# Patient Record
Sex: MALE | Race: WHITE | NOT HISPANIC OR LATINO | Employment: FULL TIME | URBAN - METROPOLITAN AREA
[De-identification: names, ages, dates, MRNs, and addresses within clinical notes are randomized per-mention and may not be internally consistent; named-entity substitution may affect disease eponyms.]

---

## 2021-11-09 ENCOUNTER — TELEPHONE (OUTPATIENT)
Dept: GASTROENTEROLOGY | Facility: CLINIC | Age: 61
End: 2021-11-09

## 2021-11-09 NOTE — TELEPHONE ENCOUNTER
Per KH, patient needs to schedule an office visit to see him for colon polyps. Left message at [phone] for patient to call back to schedule this.

## 2021-11-10 ENCOUNTER — TELEPHONE (OUTPATIENT)
Dept: GASTROENTEROLOGY | Facility: CLINIC | Age: 61
End: 2021-11-10

## 2021-11-18 ENCOUNTER — TELEPHONE (OUTPATIENT)
Dept: GASTROENTEROLOGY | Facility: CLINIC | Age: 61
End: 2021-11-18

## 2021-11-18 NOTE — TELEPHONE ENCOUNTER
Patient called about his colonoscopy procedure. Told the patient Per KH he need to come in for an office visit prior to the procedure. Patient don't want a office visit and only want the colonoscopy. Told the patient I will route this to Dr. Monique and his team.

## 2021-11-21 NOTE — TELEPHONE ENCOUNTER
AL - My concern is that he is on warfarin and I haven't seen him for a long time. If he says that he doesn't want to come see me prior to the colonoscopy then you will need to check with his doctor to make sure that it is OK to stop the warfarin for 4 days prior to the colonoscopy. On the day of the colonoscopy have [facility] draw a stat PT/INR. Also make sure that he knows all of the potential complications (that we would normal discuss in the office): reaction to medicines, bleeding or bruising, infection, stroke or MI, bowel perforation, and death.  Thx. kjh

## 2021-11-24 NOTE — TELEPHONE ENCOUNTER
AL - these are issues that I should discuss with this gentleman in the office prior to the colonoscopy. If he doesn't want to come see me to discuss the proposed procedure and the potential complications, then I would have him find another doctor to do the colonoscopy. Thx.kjh

## 2021-11-29 ENCOUNTER — TELEPHONE (OUTPATIENT)
Dept: GASTROENTEROLOGY | Facility: CLINIC | Age: 61
End: 2021-11-29

## 2021-11-29 NOTE — TELEPHONE ENCOUNTER
Returned call to Nellie at Dr Barnes's office and she was asking about the pt getting scheduled.  Advised that she can see the message trail from 11/18. Advised since pt has not been seen in years and Dr Monique would like to see the pt in the office .  Advised pt is not wanting to make office appt. Dr Monique advised if  pt does not want to be seen , he find a different MD to do his c/s.  She verb understanding and states she will show message to Dr Barnes.     Update sent to Dr Monique.

## 2021-11-29 NOTE — TELEPHONE ENCOUNTER
----- Message from Margoth Gar Rep sent at 11/29/2021  9:28 AM EST -----  Regarding: questions  Nellie from Dr Barnes office, [phone] option 5, needs to speak to you in regards to pt and notes per you and Dr Monique.

## 2022-02-10 ENCOUNTER — TELEPHONE (OUTPATIENT)
Dept: GASTROENTEROLOGY | Facility: CLINIC | Age: 62
End: 2022-02-10

## 2022-02-10 ENCOUNTER — OFFICE VISIT (OUTPATIENT)
Dept: GASTROENTEROLOGY | Facility: CLINIC | Age: 62
End: 2022-02-10

## 2022-02-10 VITALS
HEART RATE: 51 BPM | DIASTOLIC BLOOD PRESSURE: 88 MMHG | HEIGHT: 70 IN | WEIGHT: 208 LBS | OXYGEN SATURATION: 96 % | SYSTOLIC BLOOD PRESSURE: 151 MMHG | TEMPERATURE: 96 F | BODY MASS INDEX: 29.78 KG/M2

## 2022-02-10 DIAGNOSIS — R10.30 LOWER ABDOMINAL PAIN: Primary | ICD-10-CM

## 2022-02-10 DIAGNOSIS — Z12.11 ENCOUNTER FOR SCREENING FOR MALIGNANT NEOPLASM OF COLON: ICD-10-CM

## 2022-02-10 PROCEDURE — 99204 OFFICE O/P NEW MOD 45 MIN: CPT | Performed by: INTERNAL MEDICINE

## 2022-02-10 RX ORDER — EZETIMIBE 10 MG/1
10 TABLET ORAL DAILY
COMMUNITY
Start: 2022-01-06

## 2022-02-10 RX ORDER — CHLORAL HYDRATE 500 MG
2 CAPSULE ORAL
COMMUNITY

## 2022-02-10 RX ORDER — WARFARIN SODIUM 5 MG/1
1.5 TABLET ORAL DAILY
COMMUNITY
Start: 2021-10-25

## 2022-02-10 RX ORDER — DIPHENOXYLATE HYDROCHLORIDE AND ATROPINE SULFATE 2.5; .025 MG/1; MG/1
1 TABLET ORAL DAILY
COMMUNITY

## 2022-02-10 RX ORDER — METOPROLOL SUCCINATE 100 MG/1
100 TABLET, EXTENDED RELEASE ORAL
COMMUNITY
Start: 2022-01-04

## 2022-02-10 RX ORDER — MONTELUKAST SODIUM 4 MG/1
TABLET, CHEWABLE ORAL
COMMUNITY
Start: 2021-10-15

## 2022-02-10 RX ORDER — ICOSAPENT ETHYL 1000 MG/1
2 CAPSULE ORAL
COMMUNITY
Start: 2021-12-16 | End: 2022-12-16

## 2022-02-10 RX ORDER — FENOFIBRATE 160 MG/1
1 TABLET ORAL DAILY
COMMUNITY
Start: 2021-12-16

## 2022-02-10 NOTE — TELEPHONE ENCOUNTER
XIMENA William  for colonoscopy on  05/17/2022 arrive at 730am   . Mailed Prep instructions to Mailing address on-file. ----[employer]      Advised PT  that  will call with final arrival time  24 hrs before procedure. If they do not get a phone call, arrival time will stay the same as given on instructions

## 2022-02-10 NOTE — PROGRESS NOTES
Chief Complaint   Patient presents with   • screening for colonoscopy       History of Present Illness:   61 y.o. male who is on warfarin because of DVT and is here to schedule a screening colonoscopy. No rectal bleeding or melena. No diarrhea.  NO consitpation. He has lower abdominal discomfort worse after taking meds in the AM. It is better after BM. No nausea or vomiting. No heartburrn. Used to travel to [place] for work. 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 Weight has increased. PCP is Dr. Hany Barnes. NO heartburn or dysphagia.     Past Medical History:   Diagnosis Date   • History of DVT (deep vein thrombosis)    • Hyperlipidemia        Past Surgical History:   Procedure Laterality Date   • COLONOSCOPY  2002         Current Outpatient Medications:   •  colestipol (COLESTID) 1 g tablet, Take 2 tablets, twice daily for a total of 8 g/day., Disp: , Rfl:   •  ezetimibe (ZETIA) 10 MG tablet, Take 10 mg by mouth Daily., Disp: , Rfl:   •  fenofibrate 160 MG tablet, Take 1 tablet by mouth Daily., Disp: , Rfl:   •  Glucos-Chondroit-Hyaluron-MSM (GLUCOSAMINE CHONDROITIN JOINT PO), Take  by mouth., Disp: , Rfl:   •  icosapent ethyl (VASCEPA) 1 g capsule capsule, Take 2 g by mouth., Disp: , Rfl:   •  metoprolol succinate XL (TOPROL-XL) 100 MG 24 hr tablet, Take 100 mg by mouth., Disp: , Rfl:   •  Milk Thistle 1000 MG capsule, Take  by mouth., Disp: , Rfl:   •  Multiple Vitamins-Minerals (OCUVITE ADULT 50+ PO), Take  by mouth., Disp: , Rfl:   •  multivitamin (multivitamin) tablet tablet, Take 1 tablet by mouth Daily., Disp: , Rfl:   •  Omega-3 Fatty Acids (fish oil) 1000 MG capsule capsule, Take 2 g by mouth., Disp: , Rfl:   •  warfarin (COUMADIN) 5 MG tablet, Take 1.5 tablets by mouth Daily., Disp: , Rfl:     No Known Allergies    Family History   Problem Relation Age of Onset   • Colon cancer Neg Hx    • Colon polyps Neg Hx        Social History     Socioeconomic History   • Marital status:    Tobacco  Use   • Smoking status: Never Smoker   • Smokeless tobacco: Never Used   Substance and Sexual Activity   • Alcohol use: Yes     Comment: 1-2 beers daily   • Drug use: Never       Review of Systems   Gastrointestinal: Negative for abdominal pain, constipation and diarrhea.   All other systems reviewed and are negative.    Pertinent positives and negatives documented in the HPI and all other systems reviewed and were found to be negative.  Vitals:    02/10/22 0955   BP: 151/88   Pulse: 51   Temp: 96 °F (35.6 °C)   SpO2: 96%       Physical Exam  Vitals reviewed.   Constitutional:       General: He is not in acute distress.     Appearance: Normal appearance. He is well-developed. He is not diaphoretic.   HENT:      Head: Normocephalic and atraumatic. Hair is normal.      Right Ear: Hearing, tympanic membrane, ear canal and external ear normal.      Left Ear: Hearing, tympanic membrane, ear canal and external ear normal.      Nose: Nose normal. No nasal deformity.      Mouth/Throat:      Mouth: Mucous membranes are moist. No oral lesions.      Pharynx: Uvula midline. No uvula swelling.   Eyes:      General: Lids are normal. No scleral icterus.        Right eye: No discharge.         Left eye: No discharge.      Extraocular Movements: Extraocular movements intact.      Right eye: Normal extraocular motion and no nystagmus.      Left eye: Normal extraocular motion and no nystagmus.      Conjunctiva/sclera: Conjunctivae normal.      Pupils: Pupils are equal, round, and reactive to light.   Neck:      Thyroid: No thyromegaly.      Vascular: No JVD.   Cardiovascular:      Rate and Rhythm: Normal rate and regular rhythm.      Pulses: Normal pulses.      Heart sounds: Normal heart sounds. No murmur heard.  No gallop.    Pulmonary:      Effort: Pulmonary effort is normal. No respiratory distress.      Breath sounds: Normal breath sounds. No wheezing or rales.   Chest:      Chest wall: No tenderness.   Abdominal:      General:  Bowel sounds are normal. There is no distension.      Palpations: Abdomen is soft. There is no mass.      Tenderness: There is no abdominal tenderness. There is no guarding.      Hernia: No hernia is present.   Musculoskeletal:         General: No tenderness or deformity. Normal range of motion.      Cervical back: Normal range of motion and neck supple.   Lymphadenopathy:      Cervical: No cervical adenopathy.   Skin:     General: Skin is warm and dry.      Findings: No rash.   Neurological:      Mental Status: He is alert and oriented to person, place, and time.      Cranial Nerves: No cranial nerve deficit.      Motor: No abnormal muscle tone.      Coordination: Coordination normal.      Deep Tendon Reflexes: Reflexes are normal and symmetric. Reflexes normal.   Psychiatric:         Mood and Affect: Mood normal.         Behavior: Behavior normal.         Thought Content: Thought content normal.         Judgment: Judgment normal.         Diagnoses and all orders for this visit:    1. Lower abdominal pain (Primary)  -     CT Abdomen Pelvis With Contrast; Future  -     Case Request; Standing  -     COVID PRE-OP / PRE-PROCEDURE SCREENING ORDER (NO ISOLATION) - Swab, Nasopharynx; Future  -     Case Request    2. Encounter for screening for malignant neoplasm of colon  -     CT Abdomen Pelvis With Contrast; Future  -     Case Request; Standing  -     COVID PRE-OP / PRE-PROCEDURE SCREENING ORDER (NO ISOLATION) - Swab, Nasopharynx; Future  -     Case Request    Other orders  -     Follow Anesthesia Guidelines / Standing Orders; Future  -     Obtain Informed Consent; Future      Assessment:  1. Screening colonoscopy  2. He is on Warfarin for DVT.  3. Lower abdominal discomfort    Recommendations:  1. Colonoscopy. He needs to stop warfarin for 4 days prior to the c/s.   2. CT abd/pelivs    Return He needs to be off of warfarin for 4 days prior to the colonoscopy. Get a stat PT/INR on day of col.    Zane Monique  MD  2/10/2022

## 2022-02-22 ENCOUNTER — TELEPHONE (OUTPATIENT)
Dept: GASTROENTEROLOGY | Facility: CLINIC | Age: 62
End: 2022-02-22

## 2022-02-22 NOTE — TELEPHONE ENCOUNTER
Overdue alert received for CT abd/pelvis.     Call to pt.  Advise may contact [facility] to arrange.  Verb understanding.

## 2022-03-24 ENCOUNTER — HOSPITAL ENCOUNTER (OUTPATIENT)
Dept: CT IMAGING | Facility: HOSPITAL | Age: 62
Discharge: HOME | End: 2022-03-24
Admitting: INTERNAL MEDICINE

## 2022-03-24 DIAGNOSIS — R10.30 LOWER ABDOMINAL PAIN: ICD-10-CM

## 2022-03-24 DIAGNOSIS — Z12.11 ENCOUNTER FOR SCREENING FOR MALIGNANT NEOPLASM OF COLON: ICD-10-CM

## 2022-03-24 LAB — CREAT BLDA-MCNC: 1.1 MG/DL (ref 0.6–1.3)

## 2022-03-24 PROCEDURE — 74177 CT ABD & PELVIS W/CONTRAST: CPT

## 2022-03-24 PROCEDURE — 25010000002 IOPAMIDOL 61 % SOLUTION: Performed by: INTERNAL MEDICINE

## 2022-03-24 PROCEDURE — 82565 ASSAY OF CREATININE: CPT

## 2022-03-24 RX ADMIN — IOPAMIDOL 85 ML: 612 INJECTION, SOLUTION INTRAVENOUS at 14:40

## 2022-04-11 ENCOUNTER — TELEPHONE (OUTPATIENT)
Dept: GASTROENTEROLOGY | Facility: CLINIC | Age: 62
End: 2022-04-11

## 2022-04-11 NOTE — TELEPHONE ENCOUNTER
"----- Message from Zane Monique MD sent at 4/9/2022  5:08 PM EDT -----  04/09/22       I reviewed the results of this CT abd/pelvis. I recommend that he go see a Urologist because of the prostatomegaly and the \"prostatomegaly protruding into the base of the bladder\". FAX to his PCP.   Mary giraldo  "

## 2022-05-17 ENCOUNTER — ANESTHESIA EVENT (OUTPATIENT)
Dept: GASTROENTEROLOGY | Facility: HOSPITAL | Age: 62
End: 2022-05-17

## 2022-05-17 ENCOUNTER — HOSPITAL ENCOUNTER (OUTPATIENT)
Facility: HOSPITAL | Age: 62
Setting detail: HOSPITAL OUTPATIENT SURGERY
Discharge: HOME | End: 2022-05-17
Attending: INTERNAL MEDICINE | Admitting: INTERNAL MEDICINE

## 2022-05-17 ENCOUNTER — ANESTHESIA (OUTPATIENT)
Dept: GASTROENTEROLOGY | Facility: HOSPITAL | Age: 62
End: 2022-05-17

## 2022-05-17 VITALS
WEIGHT: 208 LBS | BODY MASS INDEX: 29.78 KG/M2 | OXYGEN SATURATION: 98 % | HEIGHT: 70 IN | RESPIRATION RATE: 18 BRPM | DIASTOLIC BLOOD PRESSURE: 69 MMHG | HEART RATE: 70 BPM | SYSTOLIC BLOOD PRESSURE: 110 MMHG

## 2022-05-17 DIAGNOSIS — R10.30 LOWER ABDOMINAL PAIN: ICD-10-CM

## 2022-05-17 DIAGNOSIS — Z12.11 ENCOUNTER FOR SCREENING FOR MALIGNANT NEOPLASM OF COLON: ICD-10-CM

## 2022-05-17 LAB
INR PPP: 1.45 (ref 0.9–1.1)
PROTHROMBIN TIME: 17.6 SECONDS (ref 11.7–14.2)
PSA SERPL-MCNC: 2.09 NG/ML (ref 0–4)

## 2022-05-17 PROCEDURE — 85610 PROTHROMBIN TIME: CPT | Performed by: INTERNAL MEDICINE

## 2022-05-17 PROCEDURE — 45380 COLONOSCOPY AND BIOPSY: CPT | Performed by: INTERNAL MEDICINE

## 2022-05-17 PROCEDURE — 88305 TISSUE EXAM BY PATHOLOGIST: CPT | Performed by: INTERNAL MEDICINE

## 2022-05-17 PROCEDURE — 84153 ASSAY OF PSA TOTAL: CPT | Performed by: INTERNAL MEDICINE

## 2022-05-17 PROCEDURE — 25010000002 PROPOFOL 10 MG/ML EMULSION: Performed by: ANESTHESIOLOGY

## 2022-05-17 RX ORDER — SODIUM CHLORIDE 0.9 % (FLUSH) 0.9 %
10 SYRINGE (ML) INJECTION AS NEEDED
Status: DISCONTINUED | OUTPATIENT
Start: 2022-05-17 | End: 2022-05-17 | Stop reason: HOSPADM

## 2022-05-17 RX ORDER — LIDOCAINE HYDROCHLORIDE 20 MG/ML
INJECTION, SOLUTION INFILTRATION; PERINEURAL AS NEEDED
Status: DISCONTINUED | OUTPATIENT
Start: 2022-05-17 | End: 2022-05-17 | Stop reason: SURG

## 2022-05-17 RX ORDER — PROPOFOL 10 MG/ML
VIAL (ML) INTRAVENOUS CONTINUOUS PRN
Status: DISCONTINUED | OUTPATIENT
Start: 2022-05-17 | End: 2022-05-17 | Stop reason: SURG

## 2022-05-17 RX ORDER — SODIUM CHLORIDE, SODIUM LACTATE, POTASSIUM CHLORIDE, CALCIUM CHLORIDE 600; 310; 30; 20 MG/100ML; MG/100ML; MG/100ML; MG/100ML
1000 INJECTION, SOLUTION INTRAVENOUS CONTINUOUS
Status: DISCONTINUED | OUTPATIENT
Start: 2022-05-17 | End: 2022-05-17 | Stop reason: HOSPADM

## 2022-05-17 RX ADMIN — SODIUM CHLORIDE, POTASSIUM CHLORIDE, SODIUM LACTATE AND CALCIUM CHLORIDE 1000 ML: 600; 310; 30; 20 INJECTION, SOLUTION INTRAVENOUS at 08:53

## 2022-05-17 RX ADMIN — LIDOCAINE HYDROCHLORIDE 60 MG: 20 INJECTION, SOLUTION INFILTRATION; PERINEURAL at 10:41

## 2022-05-17 RX ADMIN — PROPOFOL 200 MCG/KG/MIN: 10 INJECTION, EMULSION INTRAVENOUS at 10:41

## 2022-05-17 NOTE — ANESTHESIA PREPROCEDURE EVALUATION
Anesthesia Evaluation     Patient summary reviewed and Nursing notes reviewed                Airway   Mallampati: I  TM distance: >3 FB  Neck ROM: full  No difficulty expected  Dental - normal exam     Pulmonary - negative pulmonary ROS and normal exam   Cardiovascular - normal exam    (+) hyperlipidemia,       Neuro/Psych- negative ROS  GI/Hepatic/Renal/Endo - negative ROS     Musculoskeletal (-) negative ROS    Abdominal  - normal exam    Bowel sounds: normal.   Substance History - negative use     OB/GYN negative ob/gyn ROS         Other                        Anesthesia Plan    ASA 3     MAC       Anesthetic plan, all risks, benefits, and alternatives have been provided, discussed and informed consent has been obtained with: patient.        CODE STATUS:

## 2022-05-17 NOTE — ANESTHESIA POSTPROCEDURE EVALUATION
"Patient: Veto Encarnacion    Procedure Summary     Date: 05/17/22 Room / Location: [facility] ENDOSCOPY 5 / [facility] ENDOSCOPY    Anesthesia Start: 1038 Anesthesia Stop: 1108    Procedure: COLONOSCOPY with polypectomy (cold bx) (N/A ) Diagnosis:       Lower abdominal pain      Encounter for screening for malignant neoplasm of colon      (Lower abdominal pain [R10.30])      (Encounter for screening for malignant neoplasm of colon [Z12.11])    Surgeons: Zane Monique MD Provider: John Nevarez MD    Anesthesia Type: MAC ASA Status: 3          Anesthesia Type: MAC    Vitals  Vitals Value Taken Time   /69 05/17/22 1131   Temp     Pulse 70 05/17/22 1131   Resp 18 05/17/22 1131   SpO2 98 % 05/17/22 1131           Post Anesthesia Care and Evaluation    Patient location during evaluation: PACU  Patient participation: complete - patient participated  Level of consciousness: awake  Pain score: 0  Pain management: adequate  Airway patency: patent  Anesthetic complications: No anesthetic complications  PONV Status: none  Cardiovascular status: acceptable  Respiratory status: acceptable  Hydration status: acceptable    Comments: /69 (BP Location: Left arm, Patient Position: Sitting)   Pulse 70   Resp 18   Ht 177.8 cm (70\")   Wt 94.3 kg (208 lb)   SpO2 98%   BMI 29.84 kg/m²       "

## 2022-05-18 LAB
LAB AP CASE REPORT: NORMAL
PATH REPORT.FINAL DX SPEC: NORMAL
PATH REPORT.GROSS SPEC: NORMAL

## 2022-05-18 NOTE — H&P
"[facility]  Pre Procedure History & Physical    Chief Complaint:   Time for my colonoscopy    Subjective     HPI:   62 y.o. male who is here for a screening colonoscopy.    Past Medical History:   Past Medical History:   Diagnosis Date   • History of DVT (deep vein thrombosis)    • Hyperlipidemia        Family History:  Family History   Problem Relation Age of Onset   • Colon cancer Neg Hx    • Colon polyps Neg Hx        Social History:   reports that he has never smoked. He has never used smokeless tobacco. He reports current alcohol use. He reports that he does not use drugs.    Medications:   No medications prior to admission.       Allergies:  Patient has no known allergies.    ROS:    Pertinent items are noted in HPI     Objective     Blood pressure 110/69, pulse 70, resp. rate 18, height 177.8 cm (70\"), weight 94.3 kg (208 lb), SpO2 98 %.    Physical Exam   Constitutional: Pt is oriented to person, place, and time and well-developed, well-nourished, and in no distress.   HENT:   Mouth/Throat: Oropharynx is clear and moist.   Neck: Normal range of motion. Neck supple.   Cardiovascular: Normal rate, regular rhythm and normal heart sounds.    Pulmonary/Chest: Effort normal and breath sounds normal. No respiratory distress. No  wheezes.   Abdominal: Soft. Bowel sounds are normal.   Skin: Skin is warm and dry.   Psychiatric: Mood, memory, affect and judgment normal.     Assessment & Plan     Diagnosis:  62 y.o. male who is here for a screening colonoscopy.    Anticipated Surgical Procedure:  Colonoscopy    The risks, benefits, and alternatives of this procedure have been discussed with the patient or the responsible party- the patient understands and agrees to proceed.    Zane Monique M.D.  "

## 2022-05-25 ENCOUNTER — TELEPHONE (OUTPATIENT)
Dept: GASTROENTEROLOGY | Facility: CLINIC | Age: 62
End: 2022-05-25

## 2022-05-25 NOTE — TELEPHONE ENCOUNTER
Called pt and advised of Dr Monique's note. Verb understanding.     C/s placed in recall and hm for 05/17/2025.    Results sent to Dr Barnes thru [facility].

## 2022-05-25 NOTE — PROGRESS NOTES
05/25/22       Tell him that his prostate-specific antigen blood test came back normal at 2.09.       his colon polyps that were removed were not cancerous but some were precancerous.  I would recommend that he have a repeat colonoscopy in 3 years.       send a copy of this report to his PCP.  Mary norwood

## 2022-05-25 NOTE — TELEPHONE ENCOUNTER
----- Message from Zane Monique MD sent at 5/25/2022  6:50 AM EDT -----  05/25/22       Tell him that his prostate-specific antigen blood test came back normal at 2.09.       his colon polyps that were removed were not cancerous but some were precancerous.  I would recommend that he have a repeat colonoscopy in 3 years.       send a copy of this report to his PCP.  Paddyx. kjh

## 2025-03-17 ENCOUNTER — TELEPHONE (OUTPATIENT)
Dept: GASTROENTEROLOGY | Facility: CLINIC | Age: 65
End: 2025-03-17
Payer: COMMERCIAL

## 2025-03-17 NOTE — TELEPHONE ENCOUNTER
Last colonoscopy 5/17/22    Personal hx polyps  No family hx polyps or cx    ASA or blood thinners:  Aspirin    List medications;  Metoprolol  Ezetimibe  Trazodone  Fish oil  Blink Nutri Tears  Total beets  Glucosamine  Centrum  Milk thistle    OA form scanned in media

## 2025-03-18 ENCOUNTER — PREP FOR SURGERY (OUTPATIENT)
Dept: OTHER | Facility: HOSPITAL | Age: 65
End: 2025-03-18
Payer: COMMERCIAL

## 2025-03-18 DIAGNOSIS — Z12.11 ENCOUNTER FOR SCREENING FOR MALIGNANT NEOPLASM OF COLON: Primary | ICD-10-CM

## 2025-03-19 ENCOUNTER — TELEPHONE (OUTPATIENT)
Dept: GASTROENTEROLOGY | Facility: CLINIC | Age: 65
End: 2025-03-19
Payer: COMMERCIAL

## 2025-03-19 NOTE — TELEPHONE ENCOUNTER
XIMENA JOHN  IN SCHEDULING PT SCHEDULED 06/05/2025 ARRIVING AT 1030AM FOR 12PM PROCEDURE PT VERBALIZES HE IS NOT ON COUMADIN WHICH IS LISTED IN HIS CHART CLS PREP INFORMATION MAILED TO ADDRESS ON FILE VERIFIED BY PT OK FOR HUB TO RELAY

## 2025-04-14 ENCOUNTER — TELEPHONE (OUTPATIENT)
Dept: GASTROENTEROLOGY | Facility: CLINIC | Age: 65
End: 2025-04-14

## 2025-04-14 NOTE — TELEPHONE ENCOUNTER
"    Hub staff attempted to follow warm transfer process and was unsuccessful     Caller: Veto Encarnacion \"Matt\"    Relationship to patient: Self    Best call back number: [phone]      Patient is needing: PT NEEDS TO RESCHEDULE PROCEDURE 6-5-25. PLS CALL PT TO DISCUSS.         "

## 2025-04-28 ENCOUNTER — TELEPHONE (OUTPATIENT)
Dept: GASTROENTEROLOGY | Facility: CLINIC | Age: 65
End: 2025-04-28
Payer: COMMERCIAL

## 2025-04-28 ENCOUNTER — TELEPHONE (OUTPATIENT)
Dept: GASTROENTEROLOGY | Facility: CLINIC | Age: 65
End: 2025-04-28

## 2025-04-28 NOTE — TELEPHONE ENCOUNTER
"  Caller: Veto Encarnacion \"Matt\"    Relationship: Self    Best call back number: [phone]       What was the call regarding: PATIENT NEEDS TO RESCHEDULE COLONOSCOPY. PLEASE CONTACT AND ADVISE.      "

## 2025-04-29 ENCOUNTER — TELEPHONE (OUTPATIENT)
Dept: GASTROENTEROLOGY | Facility: CLINIC | Age: 65
End: 2025-04-29
Payer: COMMERCIAL

## 2025-04-29 NOTE — TELEPHONE ENCOUNTER
"Hub staff attempted to follow warm transfer process and was unsuccessful     Caller: Veto Encarnacion \"Matt\"    Relationship to patient: Self    Best call back number: [phone]    Patient is needing: PATIENT NEEDS TO R/S PROCEDURE WITH DR BO ON 5-21-25        "

## 2025-04-30 NOTE — TELEPHONE ENCOUNTER
"Hub staff attempted to follow warm transfer process and was unsuccessful     Caller: Veto Encarnacion \"Matt\"    Relationship to patient: Self    Best call back number: [phone]    Patient is needing: PATIENT CALLED TO R/S PROCEDURE WITH DR BO          " [id number] [facility]

## 2025-05-01 ENCOUNTER — TELEPHONE (OUTPATIENT)
Dept: GASTROENTEROLOGY | Facility: CLINIC | Age: 65
End: 2025-05-01
Payer: COMMERCIAL

## 2025-05-21 ENCOUNTER — OUTSIDE FACILITY SERVICE (OUTPATIENT)
Dept: GASTROENTEROLOGY | Facility: CLINIC | Age: 65
End: 2025-05-21
Payer: COMMERCIAL

## 2025-06-25 ENCOUNTER — LAB REQUISITION (OUTPATIENT)
Dept: LAB | Facility: HOSPITAL | Age: 65
End: 2025-06-25
Payer: MEDICARE

## 2025-06-25 ENCOUNTER — OUTSIDE FACILITY SERVICE (OUTPATIENT)
Dept: GASTROENTEROLOGY | Facility: CLINIC | Age: 65
End: 2025-06-25
Payer: MEDICARE

## 2025-06-25 DIAGNOSIS — D12.2 BENIGN NEOPLASM OF ASCENDING COLON: ICD-10-CM

## 2025-06-25 DIAGNOSIS — D12.8 BENIGN NEOPLASM OF RECTUM: ICD-10-CM

## 2025-06-25 DIAGNOSIS — Z86.0100 PERSONAL HISTORY OF COLON POLYPS, UNSPECIFIED: ICD-10-CM

## 2025-06-25 DIAGNOSIS — K64.0 FIRST DEGREE HEMORRHOIDS: ICD-10-CM

## 2025-06-25 PROCEDURE — 88305 TISSUE EXAM BY PATHOLOGIST: CPT | Performed by: INTERNAL MEDICINE

## 2025-06-25 PROCEDURE — 45380 COLONOSCOPY AND BIOPSY: CPT | Performed by: INTERNAL MEDICINE

## 2025-06-26 LAB
CYTO UR: NORMAL
LAB AP CASE REPORT: NORMAL
LAB AP CLINICAL INFORMATION: NORMAL
PATH REPORT.FINAL DX SPEC: NORMAL
PATH REPORT.GROSS SPEC: NORMAL

## (undated) DEVICE — CANN O2 ETCO2 FITS ALL CONN CO2 SMPL A/ 7IN DISP LF

## (undated) DEVICE — KT ORCA ORCAPOD DISP STRL

## (undated) DEVICE — SINGLE-USE BIOPSY FORCEPS: Brand: RADIAL JAW 4

## (undated) DEVICE — SENSR O2 OXIMAX FNGR A/ 18IN NONSTR

## (undated) DEVICE — LN SMPL CO2 SHTRM SD STREAM W/M LUER

## (undated) DEVICE — TUBING, SUCTION, 1/4" X 10', STRAIGHT: Brand: MEDLINE

## (undated) DEVICE — ADAPT CLN BIOGUARD AIR/H2O DISP